# Patient Record
Sex: FEMALE | Race: WHITE | NOT HISPANIC OR LATINO | Employment: UNEMPLOYED | URBAN - NONMETROPOLITAN AREA
[De-identification: names, ages, dates, MRNs, and addresses within clinical notes are randomized per-mention and may not be internally consistent; named-entity substitution may affect disease eponyms.]

---

## 2022-07-15 ENCOUNTER — APPOINTMENT (OUTPATIENT)
Dept: CT IMAGING | Facility: HOSPITAL | Age: 52
End: 2022-07-15

## 2022-07-15 ENCOUNTER — APPOINTMENT (OUTPATIENT)
Dept: GENERAL RADIOLOGY | Facility: HOSPITAL | Age: 52
End: 2022-07-15

## 2022-07-15 ENCOUNTER — HOSPITAL ENCOUNTER (EMERGENCY)
Facility: HOSPITAL | Age: 52
Discharge: HOME | End: 2022-07-15
Attending: FAMILY MEDICINE | Admitting: FAMILY MEDICINE

## 2022-07-15 ENCOUNTER — APPOINTMENT (OUTPATIENT)
Dept: MRI IMAGING | Facility: HOSPITAL | Age: 52
End: 2022-07-15

## 2022-07-15 VITALS
DIASTOLIC BLOOD PRESSURE: 73 MMHG | OXYGEN SATURATION: 99 % | HEART RATE: 70 BPM | SYSTOLIC BLOOD PRESSURE: 135 MMHG | RESPIRATION RATE: 18 BRPM | WEIGHT: 140 LBS | BODY MASS INDEX: 25.76 KG/M2 | TEMPERATURE: 97.7 F | HEIGHT: 62 IN

## 2022-07-15 DIAGNOSIS — D32.9 MENINGIOMA: ICD-10-CM

## 2022-07-15 DIAGNOSIS — H81.10 BENIGN PAROXYSMAL POSITIONAL VERTIGO, UNSPECIFIED LATERALITY: Primary | ICD-10-CM

## 2022-07-15 DIAGNOSIS — N39.0 URINARY TRACT INFECTION WITHOUT HEMATURIA, SITE UNSPECIFIED: ICD-10-CM

## 2022-07-15 LAB
ALBUMIN SERPL-MCNC: 4.4 G/DL (ref 3.5–5.2)
ALBUMIN/GLOB SERPL: 1.5 G/DL
ALP SERPL-CCNC: 62 U/L (ref 39–117)
ALT SERPL W P-5'-P-CCNC: 12 U/L (ref 1–33)
ANION GAP SERPL CALCULATED.3IONS-SCNC: 14 MMOL/L (ref 5–15)
AST SERPL-CCNC: 20 U/L (ref 1–32)
BACTERIA UR QL AUTO: ABNORMAL /HPF
BASOPHILS # BLD AUTO: 0.05 10*3/MM3 (ref 0–0.2)
BASOPHILS NFR BLD AUTO: 0.4 % (ref 0–1.5)
BILIRUB SERPL-MCNC: 0.5 MG/DL (ref 0–1.2)
BILIRUB UR QL STRIP: NEGATIVE
BUN SERPL-MCNC: 20 MG/DL (ref 6–20)
BUN/CREAT SERPL: 24.4 (ref 7–25)
CALCIUM SPEC-SCNC: 9.1 MG/DL (ref 8.6–10.5)
CHLORIDE SERPL-SCNC: 106 MMOL/L (ref 98–107)
CK SERPL-CCNC: 68 U/L (ref 20–180)
CLARITY UR: ABNORMAL
CO2 SERPL-SCNC: 20 MMOL/L (ref 22–29)
COLOR UR: YELLOW
CREAT SERPL-MCNC: 0.82 MG/DL (ref 0.57–1)
DEPRECATED RDW RBC AUTO: 39.3 FL (ref 37–54)
EGFRCR SERPLBLD CKD-EPI 2021: 85.7 ML/MIN/1.73
EOSINOPHIL # BLD AUTO: 0.03 10*3/MM3 (ref 0–0.4)
EOSINOPHIL NFR BLD AUTO: 0.2 % (ref 0.3–6.2)
ERYTHROCYTE [DISTWIDTH] IN BLOOD BY AUTOMATED COUNT: 13.4 % (ref 12.3–15.4)
FLUAV SUBTYP SPEC NAA+PROBE: NOT DETECTED
FLUBV RNA ISLT QL NAA+PROBE: NOT DETECTED
GLOBULIN UR ELPH-MCNC: 3 GM/DL
GLUCOSE BLDC GLUCOMTR-MCNC: 95 MG/DL (ref 70–130)
GLUCOSE SERPL-MCNC: 130 MG/DL (ref 65–99)
GLUCOSE UR STRIP-MCNC: NEGATIVE MG/DL
HCT VFR BLD AUTO: 39.8 % (ref 34–46.6)
HGB BLD-MCNC: 13.1 G/DL (ref 12–15.9)
HGB UR QL STRIP.AUTO: NEGATIVE
HOLD SPECIMEN: NORMAL
HOLD SPECIMEN: NORMAL
HYALINE CASTS UR QL AUTO: ABNORMAL /LPF
IMM GRANULOCYTES # BLD AUTO: 0.07 10*3/MM3 (ref 0–0.05)
IMM GRANULOCYTES NFR BLD AUTO: 0.6 % (ref 0–0.5)
INR PPP: 1 (ref 0.8–1.2)
KETONES UR QL STRIP: NEGATIVE
LEUKOCYTE ESTERASE UR QL STRIP.AUTO: ABNORMAL
LYMPHOCYTES # BLD AUTO: 0.96 10*3/MM3 (ref 0.7–3.1)
LYMPHOCYTES NFR BLD AUTO: 7.9 % (ref 19.6–45.3)
MAGNESIUM SERPL-MCNC: 1.9 MG/DL (ref 1.6–2.6)
MCH RBC QN AUTO: 26.5 PG (ref 26.6–33)
MCHC RBC AUTO-ENTMCNC: 32.9 G/DL (ref 31.5–35.7)
MCV RBC AUTO: 80.6 FL (ref 79–97)
MONOCYTES # BLD AUTO: 0.37 10*3/MM3 (ref 0.1–0.9)
MONOCYTES NFR BLD AUTO: 3 % (ref 5–12)
NEUTROPHILS NFR BLD AUTO: 10.74 10*3/MM3 (ref 1.7–7)
NEUTROPHILS NFR BLD AUTO: 87.9 % (ref 42.7–76)
NITRITE UR QL STRIP: POSITIVE
NRBC BLD AUTO-RTO: 0 /100 WBC (ref 0–0.2)
NT-PROBNP SERPL-MCNC: 175 PG/ML (ref 0–900)
PH UR STRIP.AUTO: 7.5 [PH] (ref 5–9)
PLATELET # BLD AUTO: 337 10*3/MM3 (ref 140–450)
PMV BLD AUTO: 9.6 FL (ref 6–12)
POTASSIUM SERPL-SCNC: 3.5 MMOL/L (ref 3.5–5.2)
PROT SERPL-MCNC: 7.4 G/DL (ref 6–8.5)
PROT UR QL STRIP: NEGATIVE
PROTHROMBIN TIME: 13 SECONDS (ref 11.1–15.3)
RBC # BLD AUTO: 4.94 10*6/MM3 (ref 3.77–5.28)
RBC # UR STRIP: ABNORMAL /HPF
REF LAB TEST METHOD: ABNORMAL
SARS-COV-2 RNA PNL SPEC NAA+PROBE: NOT DETECTED
SODIUM SERPL-SCNC: 140 MMOL/L (ref 136–145)
SP GR UR STRIP: 1.02 (ref 1–1.03)
SQUAMOUS #/AREA URNS HPF: ABNORMAL /HPF
TROPONIN T SERPL-MCNC: <0.01 NG/ML (ref 0–0.03)
UROBILINOGEN UR QL STRIP: ABNORMAL
WBC # UR STRIP: ABNORMAL /HPF
WBC NRBC COR # BLD: 12.22 10*3/MM3 (ref 3.4–10.8)
WHOLE BLOOD HOLD SPECIMEN: NORMAL

## 2022-07-15 PROCEDURE — 81001 URINALYSIS AUTO W/SCOPE: CPT | Performed by: FAMILY MEDICINE

## 2022-07-15 PROCEDURE — 85025 COMPLETE CBC W/AUTO DIFF WBC: CPT | Performed by: FAMILY MEDICINE

## 2022-07-15 PROCEDURE — 93010 ELECTROCARDIOGRAM REPORT: CPT | Performed by: INTERNAL MEDICINE

## 2022-07-15 PROCEDURE — 70498 CT ANGIOGRAPHY NECK: CPT

## 2022-07-15 PROCEDURE — 84484 ASSAY OF TROPONIN QUANT: CPT | Performed by: FAMILY MEDICINE

## 2022-07-15 PROCEDURE — 99283 EMERGENCY DEPT VISIT LOW MDM: CPT

## 2022-07-15 PROCEDURE — 80053 COMPREHEN METABOLIC PANEL: CPT | Performed by: FAMILY MEDICINE

## 2022-07-15 PROCEDURE — 93005 ELECTROCARDIOGRAM TRACING: CPT | Performed by: FAMILY MEDICINE

## 2022-07-15 PROCEDURE — 83880 ASSAY OF NATRIURETIC PEPTIDE: CPT | Performed by: FAMILY MEDICINE

## 2022-07-15 PROCEDURE — 70551 MRI BRAIN STEM W/O DYE: CPT

## 2022-07-15 PROCEDURE — 85610 PROTHROMBIN TIME: CPT | Performed by: FAMILY MEDICINE

## 2022-07-15 PROCEDURE — 82550 ASSAY OF CK (CPK): CPT | Performed by: FAMILY MEDICINE

## 2022-07-15 PROCEDURE — 70450 CT HEAD/BRAIN W/O DYE: CPT

## 2022-07-15 PROCEDURE — 83735 ASSAY OF MAGNESIUM: CPT | Performed by: FAMILY MEDICINE

## 2022-07-15 PROCEDURE — 0 IOPAMIDOL PER 1 ML: Performed by: FAMILY MEDICINE

## 2022-07-15 PROCEDURE — 70496 CT ANGIOGRAPHY HEAD: CPT

## 2022-07-15 PROCEDURE — 82962 GLUCOSE BLOOD TEST: CPT

## 2022-07-15 PROCEDURE — 87636 SARSCOV2 & INF A&B AMP PRB: CPT | Performed by: FAMILY MEDICINE

## 2022-07-15 PROCEDURE — 36415 COLL VENOUS BLD VENIPUNCTURE: CPT

## 2022-07-15 PROCEDURE — 0042T HC CT CEREBRAL PERFUSION W/WO CONTRAST: CPT

## 2022-07-15 PROCEDURE — 71045 X-RAY EXAM CHEST 1 VIEW: CPT

## 2022-07-15 RX ORDER — CEPHALEXIN 500 MG/1
500 CAPSULE ORAL 3 TIMES DAILY
Qty: 21 CAPSULE | Refills: 0 | Status: SHIPPED | OUTPATIENT
Start: 2022-07-15

## 2022-07-15 RX ORDER — SODIUM CHLORIDE 9 MG/ML
100 INJECTION, SOLUTION INTRAVENOUS
Status: COMPLETED | OUTPATIENT
Start: 2022-07-15 | End: 2022-07-15

## 2022-07-15 RX ORDER — ONDANSETRON 4 MG/1
4 TABLET, ORALLY DISINTEGRATING ORAL EVERY 6 HOURS PRN
Qty: 10 TABLET | Refills: 0 | Status: SHIPPED | OUTPATIENT
Start: 2022-07-15

## 2022-07-15 RX ADMIN — IOPAMIDOL 90 ML: 755 INJECTION, SOLUTION INTRAVENOUS at 10:14

## 2022-07-15 RX ADMIN — IOPAMIDOL 50 ML: 755 INJECTION, SOLUTION INTRAVENOUS at 10:20

## 2022-07-15 RX ADMIN — SODIUM CHLORIDE 100 ML: 9 INJECTION, SOLUTION INTRAVENOUS at 10:20

## 2022-07-15 NOTE — ED NOTES
"Patient presents to the ED with c/o dizziness, weakness, and vomiting since 0430 this AM. Patient reports, \"I've been having dizziness on and off for a long while now, and I just thought it was vertigo since my sister suffers from vertigo.\" Patient reports feeling a tingling sensation on her left arm and left leg; denies loss of strength, but states, \"My right wrist is sometimes weaker.\"    Patient also reports intermittent chest pain and shortness of breath \"every now and then,\" but denies such currently.   "

## 2022-07-15 NOTE — ED NOTES
This tech transported the patient back from CT. The patient was provided with wipes and clean underwear to put on.

## 2022-07-15 NOTE — ED PROVIDER NOTES
Subjective   Patient woke up with dizziness, vomiting, sweating, and shortness of air.  She states that the symptoms have been intermittent and have been occurring 3-4 times a week for the past 4 months, but this morning has been worse.  She also states that she developed numbness in her left arm and left leg today that is new.  Her last known normal was last night when she went to bed.  She denies a history of stroke, and does not take any medications, does not have a history of diabetes, hypertension, or hypercholesterolemia.  Patient denies a history of tobacco use.        Dizziness  Quality:  Vertigo  Severity:  Moderate  Duration:  2 hours  Timing:  Constant  Progression:  Waxing and waning  Chronicity:  Chronic  Relieved by:  Nothing  Worsened by:  Nothing  Associated symptoms: chest pain, nausea, shortness of breath, vomiting and weakness    Associated symptoms: no diarrhea and no headaches    Vomiting  The primary symptoms include nausea and vomiting. Primary symptoms do not include fever, fatigue, abdominal pain, diarrhea, dysuria, myalgias or rash.   The illness does not include chills.   Weakness - Generalized  Associated symptoms: chest pain, dizziness, nausea, shortness of breath and vomiting    Associated symptoms: no abdominal pain, no cough, no diarrhea, no dysuria, no fever, no frequency, no headaches, no myalgias, no seizures and no urgency        Review of Systems   Constitutional: Positive for activity change. Negative for appetite change, chills, diaphoresis, fatigue and fever.   HENT: Negative for congestion, ear discharge, ear pain, nosebleeds, rhinorrhea, sinus pressure, sore throat and trouble swallowing.    Eyes: Negative for discharge and redness.   Respiratory: Positive for shortness of breath. Negative for apnea, cough, chest tightness and wheezing.    Cardiovascular: Positive for chest pain.   Gastrointestinal: Positive for nausea and vomiting. Negative for abdominal pain and diarrhea.    Endocrine: Negative for polyuria.   Genitourinary: Negative for dysuria, frequency and urgency.   Musculoskeletal: Negative for myalgias and neck pain.   Skin: Negative for color change and rash.   Allergic/Immunologic: Negative for immunocompromised state.   Neurological: Positive for dizziness, weakness and numbness. Negative for seizures, syncope, light-headedness and headaches.   Hematological: Negative for adenopathy. Does not bruise/bleed easily.   Psychiatric/Behavioral: Negative for behavioral problems and confusion.   All other systems reviewed and are negative.      Past Medical History:   Diagnosis Date   • Chronic fatigue    • Fibromyalgia        No Known Allergies    History reviewed. No pertinent surgical history.    History reviewed. No pertinent family history.    Social History     Socioeconomic History   • Marital status:    Tobacco Use   • Smoking status: Never Smoker   • Smokeless tobacco: Never Used   Vaping Use   • Vaping Use: Never used   Substance and Sexual Activity   • Alcohol use: Not Currently   • Drug use: Never   • Sexual activity: Defer           Objective   Physical Exam  Vitals and nursing note reviewed.   Constitutional:       Appearance: She is well-developed.   HENT:      Head: Normocephalic and atraumatic.      Nose: Nose normal.   Eyes:      General: No scleral icterus.        Right eye: No discharge.         Left eye: No discharge.      Conjunctiva/sclera: Conjunctivae normal.      Pupils: Pupils are equal, round, and reactive to light.   Neck:      Trachea: No tracheal deviation.   Cardiovascular:      Rate and Rhythm: Normal rate and regular rhythm.      Heart sounds: Normal heart sounds. No murmur heard.  Pulmonary:      Effort: Pulmonary effort is normal. No respiratory distress.      Breath sounds: Normal breath sounds. No stridor. No wheezing or rales.   Abdominal:      General: Bowel sounds are normal. There is no distension.      Palpations: Abdomen is soft.  There is no mass.      Tenderness: There is no abdominal tenderness. There is no guarding or rebound.   Musculoskeletal:      Cervical back: Normal range of motion and neck supple.   Skin:     General: Skin is warm and dry.      Findings: No erythema or rash.   Neurological:      Mental Status: She is alert and oriented to person, place, and time.      GCS: GCS eye subscore is 4. GCS verbal subscore is 5. GCS motor subscore is 6.      Coordination: Coordination abnormal.      Comments: Patient had some difficulty, requiring extra time, to perform the finger-to-nose test on the left but was able to accomplish the task.   Psychiatric:         Behavior: Behavior normal.         Thought Content: Thought content normal.         ECG 12 Lead      Date/Time: 7/15/2022 1:05 PM  Performed by: Steven Thrasher MD  Authorized by: Steven Thrasher MD   Interpreted by physician  Rhythm: sinus rhythm  BPM: 80  ST Segments: ST segments normal                   ED Course                   Labs Reviewed   COMPREHENSIVE METABOLIC PANEL - Abnormal; Notable for the following components:       Result Value    Glucose 130 (*)     CO2 20.0 (*)     All other components within normal limits    Narrative:     GFR Normal >60  Chronic Kidney Disease <60  Kidney Failure <15     URINALYSIS W/ CULTURE IF INDICATED - Abnormal; Notable for the following components:    Appearance, UA Cloudy (*)     Leuk Esterase, UA Trace (*)     Nitrite, UA Positive (*)     All other components within normal limits    Narrative:     In absence of clinical symptoms, the presence of pyuria, bacteria, and/or nitrites on the urinalysis result does not correlate with infection.   CBC WITH AUTO DIFFERENTIAL - Abnormal; Notable for the following components:    WBC 12.22 (*)     MCH 26.5 (*)     Neutrophil % 87.9 (*)     Lymphocyte % 7.9 (*)     Monocyte % 3.0 (*)     Eosinophil % 0.2 (*)     Immature Grans % 0.6 (*)     Neutrophils, Absolute 10.74 (*)      Immature Grans, Absolute 0.07 (*)     All other components within normal limits   URINALYSIS, MICROSCOPIC ONLY - Abnormal; Notable for the following components:    RBC, UA 0-2 (*)     Bacteria, UA 3+ (*)     Squamous Epithelial Cells, UA 13-20 (*)     All other components within normal limits   COVID-19 AND FLU A/B, NP SWAB IN TRANSPORT MEDIA 8-12 HR TAT - Normal    Narrative:     Fact sheet for providers: https://www.fda.gov/media/164602/download    Fact sheet for patients: https://www.fda.gov/media/622223/download    Test performed by PCR.   PROTIME-INR - Normal    Narrative:     Therapeutic range for most indications is 2.0-3.0 INR,  or 2.5-3.5 for mechanical heart valves.   BNP (IN-HOUSE) - Normal    Narrative:     Among patients with dyspnea, NT-proBNP is highly sensitive for the detection of acute congestive heart failure. In addition NT-proBNP of <300 pg/ml effectively rules out acute congestive heart failure with 99% negative predictive value.    Results may be falsely decreased if patient taking Biotin.     TROPONIN (IN-HOUSE) - Normal    Narrative:     Troponin T Reference Range:  <= 0.03 ng/mL-   Negative for AMI  >0.03 ng/mL-     Abnormal for myocardial necrosis.  Clinicians would have to utilize clinical acumen, EKG, Troponin and serial changes to determine if it is an Acute Myocardial Infarction or myocardial injury due to an underlying chronic condition.       Results may be falsely decreased if patient taking Biotin.     CK - Normal   MAGNESIUM - Normal   POCT GLUCOSE FINGERSTICK - Normal   CBC AND DIFFERENTIAL    Narrative:     The following orders were created for panel order CBC & Differential.  Procedure                               Abnormality         Status                     ---------                               -----------         ------                     CBC Auto Differential[[id number]]        Abnormal            Final result                 Please view results for these tests on the  individual orders.   EXTRA TUBES    Narrative:     The following orders were created for panel order Extra Tubes.  Procedure                               Abnormality         Status                     ---------                               -----------         ------                     Lavender Top[[id number]]                                     Final result               Gold Top - SST[[id number]]                                   Final result               Mathews Top[[id number]]                                         Final result                 Please view results for these tests on the individual orders.   LAVENDER TOP   GOLD TOP - SST   GRAY TOP       MRI Brain Without Contrast   Final Result   1.7 cm meningioma parasagittal left anterior parietal   lobe without significant mass effect shift or edema.      MRI brain without contrast is otherwise unremarkable.           Electronically signed by:  Alton Roa MD  7/15/2022 12:47 PM CDT   Workstation: [id number]      CT CEREBRAL PERFUSION WITH & WITHOUT CONTRAST   Final Result   No CT perfusion evidence for ischemia or infarct.      Hyperenhancing 1.3 cm mass at the left frontoparietal vertex   extra-axial space which could reflect meningioma.         Electronically signed by:  Lenny Berg MD  7/15/2022 11:28 AM CDT   Workstation: [id number]      CT Angiogram Head w AI Analysis of LVO   Final Result    IMPRESSION: Unremarkable CT angiogram of the neck without   evidence of carotid stenosis. Enhancing mass parasagittal left   parietal region possibly meningioma. Otherwise unremarkable CT   angiography of the head, intracranial circulation. Normal CT   angiography of the head, intracranial circulation.      Electronically signed by:  Alton Roa MD  7/15/2022 11:43 AM CDT   Workstation: [id number]      CT Angiogram Neck   Final Result    IMPRESSION: Unremarkable CT angiogram of the neck without   evidence of carotid stenosis. Enhancing mass parasagittal left   parietal region  possibly meningioma. Otherwise unremarkable CT   angiography of the head, intracranial circulation. Normal CT   angiography of the head, intracranial circulation.      Electronically signed by:  Alton Roa MD  7/15/2022 11:43 AM CDT   Workstation: [id number]      XR Chest 1 View   Final Result   No evidence of active disease.          Electronically signed by:  Alton Roa MD  7/15/2022 8:19 AM CDT   Workstation: [id number]      CT Head Without Contrast   Final Result   Addendum 1 of 1    ADDENDUM    ADDENDUM #1          Addendum:      On a later CT perfusion, there is a 1.3 cm oval hyperenhancing   extra-axial mass the left frontoparietal vertex which could   reflect a meningioma.      Electronically signed by:  Lenny Berg MD  7/15/2022 11:28 AM CDT   Workstation: [id number]         Final   No acute intracranial abnormality.         Electronically signed by:  Lenny Berg MD  7/15/2022 8:07 AM CDT   Workstation: [id number]                                     MDM    Final diagnoses:   Benign paroxysmal positional vertigo, unspecified laterality   Meningioma (HCC)   Urinary tract infection without hematuria, site unspecified       ED Disposition  ED Disposition     ED Disposition   Discharge    Condition   Stable    Comment   --             [facility]  [address] [place] [address] [phone]  In 3 days      [facility]  [place] [phone]  [place] [phone]  Bessie Sprague [phone]  Schedule an appointment as soon as possible for a visit            Medication List      New Prescriptions    cephalexin 500 MG capsule  Commonly known as: KEFLEX  Take 1 capsule by mouth 3 (Three) Times a Day.     ondansetron ODT 4 MG disintegrating tablet  Commonly known as: ZOFRAN-ODT  Place 1 tablet on the tongue Every 6 (Six) Hours As Needed for Nausea or Vomiting.           Where to Get Your Medications      These medications were sent to  [id number] - [place], [place] - [address] [place] [address] AT [address] [place] [address] - [phone]  - [phone]  [address] [address]    Phone: [phone]   · cephalexin 500 MG capsule  · ondansetron ODT 4 MG disintegrating tablet          Steven Thrasher MD  07/15/22 1301       Steven Thrasher MD  07/15/22 1302

## 2022-07-15 NOTE — CONSULTS
[facility]   Teleneurology Note    Patient Name: Mary Schwartztrauber  : 1970  MRN: [id number]  Primary Care Physician: Provider, No Known  Referring Site: [facility]   Teleneurology Initial Data     Arrival Date Telestroke Site: 07/15/22     Neurologist Evaluation Date: 07/15/22 Neurologist Evaluation Time: 1000   Date Last Known Well: 22       History     Chief Complaint: dizziness and left sided numbness    Stroke Risk Factors/ Pertinent Data     Stroke risk factors: none  Anticoagulants prior to arrival: none  Antiplatelets prior to arrival: none     Pre- Stroke Modified Refugio Scale     Pre-Stroke Modified Refugio Scale: 0 - No Symptoms at all.    NIH Stroke Scale     NIHSS Performed Date: 07/15/22 NIHSS Performed Time: 09   Interval: baseline  1a. Level of Consciousness: 0-->Alert, keenly responsive  1b. LOC Questions: 0-->Answers both questions correctly  1c. LOC Commands: 0-->Performs both tasks correctly  2. Best Gaze: 0-->Normal  3. Visual: 0-->No visual loss  5a. Motor Arm, Left: 0-->No drift, limb holds 90 (or 45) degrees for full 10 secs  5b. Motor Arm, Right: 0-->No drift, limb holds 90 (or 45) degrees for full 10 secs  6a. Motor Leg, Left: 0-->No drift, leg holds 30 degree position for full 5 secs  6b. Motor Leg, Right: 0-->No drift, leg holds 30 degree position for full 5 secs  7. Limb Ataxia: 0-->Absent  8. Sensory: 0-->Normal, no sensory loss  9. Best Language: 0-->No aphasia, normal  10. Dysarthria: 0-->Normal  11. Extinction and Inattention (formerly Neglect): 0-->No abnormality       Results     Personal review of CNS imaging:(Official report by radiologist pending)  Imaging  CT Imaging Review: CT Imaging reviewed, NO acute infarct/ hemorrhage seen    Thrombolytic   Thrombolytics: tPA not given  Tissue Plasminogen Activator (tPA) Exclusion Criteria: Onset unknown or GREATER than 4.5 hours  Tissue Plasminogen Activator (tPA) Relative Exclusions for All Patients:  Only minor non-disabling symptoms     Assessment & Plan   Assessment/ Plan   Assessment:  Acute Stroke Evaluation: Suspected ACUTE ischemic stroke     Ms. Mary Schwartztrauber is a 51 yo woman with past medical history of chronic fatigue and fibromyalgia presenting with dizziness and left sided numbness.    Patient presents by herself and provides her own history.    Woke up at 4:30pm   Sweating and room spinning   Tingling in arm and leg started then felt like her left side went numb.  At present she is asymptomatic and the event lasted only a few minutes.  No associated weakness, vision changes or changes in speech or comprehension.    She reports she has had recurrent similar episodes over the last few months. They are described just as the ones above - she says she will stand up or get out of bed and suddenly have an intense feeling of room spinning and lightheadedness. She denies any previous episodes having associated numbness and tingling or other focal neurologic symptoms. All of the previous episodes lasted only minutes with spontaneous resolution.    No ear pain or hearing loss.  No recent flu or viral illness  No new medications     Past Med Hx  Chronic fatigue  Fibromyalgia    Social  Works as a house keeper  Lives with her   No smoking     No home medications    Exam  Vitals:    07/15/22 1030   BP: 148/81   Pulse:    Resp:    Temp:    SpO2:        Examination at 10:51 CDT on 07/15/22  NIHSS 0   Gabrielle is awake and alert  She is asymptomatic at present   She provides all of her own history  She is oriented to July 2022 and place   Speech and language is intact   No dysarthria  Extraocular eye movements are intact  No nystagmus   No visual field loss   Face symmetric   Facial sensation symmetric   No drift in upper extremities  No dirft in lower extremities   Finger-nose-finger intact bilaterally     Imaging   CT without evidence of ischemia or hemorrhage   [ ] CT angiograms pending  [ ] MRI pending      #Episodic dizziness with focal neurologic symptoms   Etiology is likely peripheral given sudden severe episodes with return to baseline in between, Benign paroxysmal positional vertigo (BPPV).   - discussed at length with patient likely diagnosis of BPPV. Recommend looking up youtube videos for epley maneuver and staying well hydrated. Can consider PRN meclazine.   - Given this episode had associated hemibody numbness would get MRI brain and if normal, okay for discharge and follow up with PCP     Patient expressed understanding in assessment and plan. Answered all questions       Stephanie Mejia MD  We will sign off   If you have any questions about the patient recommendations,   please call [phone] at anytime and ask to speak with the neurologist on call.   Press 1 for an emergent consult and 2 for a non-emergent consult.    Disposition     Disposition: The patient will remain at the referring institution for further evaluation and management    Medical Decision Making  Medical Data Reviewed: Data reviewed including: clinical labs, radiology and/or medical tests    I, Stephanie Mejia MD, saw the patient on 07/15/22 at 1000 for an initial in-patient or emergency room telememedicine face to face consult using interactive technology for  . The location of the patient was [place]. I was located at  . I was assisted with the exam by  .    I have proceeded with this evaluation at the request of the referring practitioner as it is felt to be an emergency setting and no appropriate specialist is available to perform this evaluation. The [facility] hospital has reported that this is the correct patient and has obtained consent from the patient/surrogate to perform this telemedicine evaluation(including obtaining history, performing examination and reviewing data provided by the patient an/or originating site of care provider)    I have introduced myself to the patient, provided my credentials,  disclosed my location, and determined that, based on review of the patient's chart and discussion with the patient's primary team, telemedicine via a HIPAA compliant, real-time, face-to-face two-way, interactive audio and video platform is an appropriate and effective means of providing the service.    The patient/surrogate has a right to refuse this evaluation as they have been explained risks including potential loss of confidentiality, benefits, alternatives, and the potential need for subsequent face-to-face care. In this evaluation, we will be providing recommendations only.  The ultimate decision to follow or not to follow these recommendations will be left to the bedside treating/requesting practitioner.    The patient/surrogate has been notified that other healthcare professionals including technical person may be involved in this A/V evaluation.  All laws concerning confidentiality and patient access to medical records and copies of medical records apply to telemedicine.  The patient/surrogate has received the originating site's Health Notice of Privacy Practices.    Stephanie Mejia MD

## 2022-07-18 ENCOUNTER — OFFICE VISIT (OUTPATIENT)
Dept: FAMILY MEDICINE CLINIC | Facility: CLINIC | Age: 52
End: 2022-07-18

## 2022-07-18 VITALS
SYSTOLIC BLOOD PRESSURE: 130 MMHG | WEIGHT: 144 LBS | HEIGHT: 62 IN | BODY MASS INDEX: 26.5 KG/M2 | DIASTOLIC BLOOD PRESSURE: 80 MMHG | OXYGEN SATURATION: 96 % | HEART RATE: 74 BPM

## 2022-07-18 DIAGNOSIS — D32.9 MENINGIOMA: Primary | ICD-10-CM

## 2022-07-18 PROCEDURE — 99203 OFFICE O/P NEW LOW 30 MIN: CPT | Performed by: STUDENT IN AN ORGANIZED HEALTH CARE EDUCATION/TRAINING PROGRAM

## 2022-07-18 NOTE — PROGRESS NOTES
Family Medicine Residency  Ophelia James MD    Subjective:     Mary Schwartztrauber is a 52 y.o. female who presents to [facility] care. Patient states she has history of fibromyalgia diagnosed by rheumatology in [place] several years ago. She has not seen rheum in several years now. States rheum had tried her on multiple medications but were always discontinued due to adverse effects. Currently does not take any medications for Fibromyalgia. She was seen in ER on Friday (7/15) for concerns of left body tingling, numbness, vertigo associated with n/v, lethargy. Underwent multiple scans and labs. MRI demonstrated 1.7cm meningioma. She was told to follow up with PCP.     The following portions of the patient's history were reviewed and updated as appropriate: past family history, past medical history, past social history, past surgical history and problem list.    Past Medical Hx:  Past Medical History:   Diagnosis Date   • Chronic fatigue    • Fibromyalgia        Past Surgical Hx:  No past surgical history on file.    Current Meds:    Current Outpatient Medications:   •  cephalexin (KEFLEX) 500 MG capsule, Take 1 capsule by mouth 3 (Three) Times a Day., Disp: 21 capsule, Rfl: 0  •  ondansetron ODT (ZOFRAN-ODT) 4 MG disintegrating tablet, Place 1 tablet on the tongue Every 6 (Six) Hours As Needed for Nausea or Vomiting., Disp: 10 tablet, Rfl: 0    Allergies:  No Known Allergies    Family Hx:  History reviewed. No pertinent family history.     Social History:  Social History     Socioeconomic History   • Marital status:    Tobacco Use   • Smoking status: Never Smoker   • Smokeless tobacco: Never Used   Vaping Use   • Vaping Use: Never used   Substance and Sexual Activity   • Alcohol use: Not Currently   • Drug use: Never   • Sexual activity: Defer       Review of Systems  Review of Systems   Constitutional: Negative.    Respiratory: Negative.    Cardiovascular: Negative.    Gastrointestinal: Negative.   "  Endocrine: Negative.    Musculoskeletal: Negative for arthralgias and myalgias.   Skin: Negative.    Neurological: Negative.  Negative for headaches.   Psychiatric/Behavioral: Negative.  Negative for suicidal ideas.       Objective:     /80   Pulse 74   Ht 157.5 cm (62\")   Wt 65.3 kg (144 lb)   SpO2 96%   BMI 26.34 kg/m²   Physical Exam  Constitutional:       Appearance: Normal appearance.   HENT:      Head: Normocephalic and atraumatic.      Nose: Nose normal.      Mouth/Throat:      Mouth: Mucous membranes are moist.   Cardiovascular:      Rate and Rhythm: Normal rate and regular rhythm.      Pulses: Normal pulses.      Heart sounds: Normal heart sounds.   Pulmonary:      Effort: Pulmonary effort is normal.      Breath sounds: Normal breath sounds.   Abdominal:      General: Bowel sounds are normal.      Palpations: Abdomen is soft.   Musculoskeletal:         General: Normal range of motion.      Cervical back: Normal range of motion.   Skin:     General: Skin is warm and dry.      Capillary Refill: Capillary refill takes less than 2 seconds.   Neurological:      General: No focal deficit present.      Mental Status: She is alert.   Psychiatric:         Mood and Affect: Mood normal.         Behavior: Behavior normal.          Assessment/Plan:     Diagnoses and all orders for this visit:    1. Meningioma (HCC) (Primary)  -     Ambulatory Referral to Neurosurgery      Reviewed ER visit documentation from 7/15/2022. MRI demonstrated 1.7 cm meningioma parasagittal left anterior parietal lobe without significant mass effect shift or edema. Will send patient to neurosurgery for further evaluation. If neurosurgery does not believe this is cause of her symptoms which prompted her ER visit, can consider rheumatology workup (ESR, CRP, RF, ASO) . She was previously diagnosed with Fibromyalgia however has not been seen by Rheum in many years and currently does not take any medications.     · Rx changes: see " a/p  · Patient Education: see a/p, go to ER should symptoms worsen   · Compliance at present is estimated to be good.   · Efforts to improve compliance (if necessary) will be directed at increased exercise.    Depression screening: Up to date; last screen      Follow-up:     Return in about 1 month (around 8/18/2022).    Preventative:  Health Maintenance   Topic Date Due   • MAMMOGRAM  Never done   • COLORECTAL CANCER SCREENING  Never done   • ANNUAL PHYSICAL  Never done   • TDAP/TD VACCINES (1 - Tdap) Never done   • ZOSTER VACCINE (1 of 2) Never done   • COVID-19 Vaccine (4 - Booster for Pfizer series) 03/29/2022   • HEPATITIS C SCREENING  Never done   • PAP SMEAR  Never done   • INFLUENZA VACCINE  10/01/2022   • Pneumococcal Vaccine 0-64  Aged Out     Female Preventative: Exercises regularly  Recommended: none  Vaccine Counseling: N/A    Weight  -Class: Overweight: 25.0-29.9kg/m2   -BMI is >= 25 and <30. (Overweight) The following options were offered after discussion;: exercise counseling/recommendations   increase water intake and increase physical activity    Alcohol use:  reports previous alcohol use.  Nicotine status  reports that she has never smoked. She has never used smokeless tobacco.     Goals    None         RISK SCORE: 3           Ophelia James M.D. PGY 2  [facility]  [address] [place] [address]  Office: [phone]  This document has been electronically signed by Ophelia James MD on July 18, 2022 16:55 CDT

## 2022-07-21 NOTE — PROGRESS NOTES
I have reviewed the patient.  I have reviewed the notes, assessments, and/or procedures performed by Dr Ophelia James, I concur with her  documentation and assessment and plan for Mary Schwartztrauber.          This document has been electronically signed by Srinivasan Hyde MD on July 21, 2022 15:59 CDT

## 2022-07-23 LAB
QT INTERVAL: 394 MS
QTC INTERVAL: 454 MS
